# Patient Record
Sex: MALE | Race: WHITE | HISPANIC OR LATINO | Employment: FULL TIME | URBAN - METROPOLITAN AREA
[De-identification: names, ages, dates, MRNs, and addresses within clinical notes are randomized per-mention and may not be internally consistent; named-entity substitution may affect disease eponyms.]

---

## 2017-09-05 ENCOUNTER — OCCUPATIONAL MEDICINE (OUTPATIENT)
Dept: URGENT CARE | Facility: PHYSICIAN GROUP | Age: 27
End: 2017-09-05
Payer: COMMERCIAL

## 2017-09-05 VITALS
HEART RATE: 90 BPM | BODY MASS INDEX: 26.66 KG/M2 | HEIGHT: 65 IN | SYSTOLIC BLOOD PRESSURE: 130 MMHG | OXYGEN SATURATION: 99 % | TEMPERATURE: 98.6 F | RESPIRATION RATE: 16 BRPM | WEIGHT: 160 LBS | DIASTOLIC BLOOD PRESSURE: 68 MMHG

## 2017-09-05 DIAGNOSIS — S39.012A LUMBAR STRAIN, INITIAL ENCOUNTER: ICD-10-CM

## 2017-09-05 DIAGNOSIS — Z02.1 PRE-EMPLOYMENT DRUG SCREENING: ICD-10-CM

## 2017-09-05 LAB
AMP AMPHETAMINE: NORMAL
BREATH ALCOHOL COMMENT: NORMAL
COC COCAINE: NORMAL
INT CON NEG: NORMAL
INT CON POS: NORMAL
MET METHAMPHETAMINES: NORMAL
OPI OPIATES: NORMAL
PCP PHENCYCLIDINE: NORMAL
POC BREATHALIZER: 0 PERCENT (ref 0–0.01)
POC DRUG COMMENT 753798-OCCUPATIONAL HEALTH: NORMAL
THC: NORMAL

## 2017-09-05 PROCEDURE — 80305 DRUG TEST PRSMV DIR OPT OBS: CPT | Performed by: FAMILY MEDICINE

## 2017-09-05 PROCEDURE — 99203 OFFICE O/P NEW LOW 30 MIN: CPT | Performed by: FAMILY MEDICINE

## 2017-09-05 PROCEDURE — 82075 ASSAY OF BREATH ETHANOL: CPT | Performed by: FAMILY MEDICINE

## 2017-09-05 ASSESSMENT — PAIN SCALES - GENERAL: PAINLEVEL: 7=MODERATE-SEVERE PAIN

## 2017-09-05 NOTE — LETTER
"EMPLOYEE’S CLAIM FOR COMPENSATION/ REPORT OF INITIAL TREATMENT  FORM C-4    EMPLOYEE’S CLAIM - PROVIDE ALL INFORMATION REQUESTED   First Name  Tomas Last Name  Lorin Guerrier Birthdate                    1990                Sex  male Claim Number   Home Address  [place] Age  27 y.o. Height  1.651 m (5' 5\") Weight  72.6 kg (160 lb) N     [facility] [place]  [address] Telephone  [phone] (home)    Mailing Address  [place] [facility] Zip  [address] Primary Language Spoken  English    Insurer   Third Party    [employer]    Employee's Occupation (Job Title) When Injury or Occupational Disease Occurred       Employer's Name  [employer]  Telephone  [phone]    Employer Address  [address]  Zip  [address]    Date of Injury  9/5/2017               Hour of Injury  10:00 AM Date Employer Notified  9/5/2017 Last Day of Work after Injury or Occupational Disease  9/5/2017 Supervisor to Whom Injury Reported  Fili Yanez   Address or Location of Accident (if applicable)  [[place] [address]]   What were you doing at the time of accident? 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 (Be specific an answer in detail. Use additional sheet if necessary)  by lifting stuff   If you believe that you have an occupational disease, when did you first have knowledge of the disability and it relationship to your employment?  n/a Witnesses to the Accident  Jb Ac      Nature of Injury or Occupational Disease  Strain  Part(s) of Body Injured or Affected  Lower Back Area (Lumbar Area & Lumbo-Sacral), ,     I certify that the above is true and correct to the best of my knowledge and that I have provided this information in order to obtain the benefits of [place]  I hereby authorize any physician, chiropractor, surgeon, practitioner, or other person, any hospital, including [facility] or [facility], any medical service organization, any insurance company, or other institution or organization to release to each other, any medical or other information, including benefits paid or payable, pertinent to this injury or disease, except information relative to diagnosis, treatment and/or counseling for AIDS, psychological conditions, alcohol or controlled substances, for which I must give specific authorization.  A Photostat of this authorization shall be as valid as the original.     Date   Place   Employee’s Signature   THIS REPORT MUST BE COMPLETED AND MAILED WITHIN 3 WORKING DAYS OF TREATMENT   Place  [facility]  Name of Facility  [place]   Date  2017 Diagnosis  (Z02.1) Pre-employment drug screening Is there evidence the injured employee was under the influence of alcohol and/or another controlled substance at the time of accident?   Hour  12:19 PM Description of Injury or Disease  The encounter diagnosis was Pre-employment drug screening. No   Treatment  Over-the-counter ibuprofen 800 mg every 8 hours by mouth. Rice therapy. Stretching as discussed.  Have you advised the patient to remain off work five days or more? No   X-Ray Findings      If Yes   From Date  To Date      From information given by the employee, together with medical evidence, can you directly connect this injury or occupational disease as job incurred?  Yes If No Full Duty  No Modified Duty  Yes   Is additional medical care by a physician indicated?  Yes If Modified Duty, Specify any Limitations / Restrictions  Sitting: < or = to 2 hrs/day  Stoopin hrs/day Bending: < or = to 2 hrs/day  Squatting: < or = to 1 hr/day  Climbing: < or = to 2 hrs/day Pushing: < or = to 4 hrs/day  Pulling: < or = to 4  "hrs/day     Repetitive Actions  Not To Exceed Weight Limits   Weight Limit (LB): < or = to 10 pounds  Weight Limit (LB): < or = to 10 pounds       Do you know of any previous injury or disease contributing to this condition or occupational disease?                            No   Date  9/5/2017 Print Doctor’s Name Filemon Lantigua M.D. I certify the employer’s copy of  this form was mailed on:   Address  [address] Insurer’s Use Only     [facility] Zip  [address]    Provider’s Tax ID Number  [id number]  Telephone  Dept: [phone]        e-IFLEMON Campos M.D.   e-Signature: Dr. Fredis Lemons, Medical Director Degree  MD        ORIGINAL-TREATING PHYSICIAN OR CHIROPRACTOR    PAGE 2-INSURER/TPA    PAGE 3-EMPLOYER    PAGE 4-EMPLOYEE             Form C-4 (rev10/07)              BRIEF DESCRIPTION OF RIGHTS AND BENEFITS  (Pursuant to [place] 616[place])    Notice of Injury or Occupational Disease (Incident Report Form C-1): If an injury or occupational disease (OD) arises out of and in the  course of employment, you must provide written notice to your employer as soon as practicable, but no later than 7 days after the accident or  OD. Your employer shall maintain a sufficient supply of the required forms.    Claim for Compensation (Form C-4): If medical treatment is sought, the form C-4 is available at the place of initial treatment. A completed  \"Claim for Compensation\" (Form C-4) must be filed within 90 days after an accident or OD. The treating physician or chiropractor must,  within 3 working days after treatment, complete and mail to the employer, the employer's insurer and third-party , the Claim for  Compensation.    Medical Treatment: If you require medical treatment for your on-the-job injury or OD, you may be required to select a physician or  chiropractor from a list provided by your workers’ compensation insurer, if it has contracted with an Organization for Managed Care (MCO) " or  Preferred Provider Organization (PPO) or providers of health care. If your employer has not entered into a contract with an MCO or PPO, you  may select a physician or chiropractor from the Panel of Physicians and Chiropractors. Any medical costs related to your industrial injury or  OD will be paid by your insurer.    Temporary Total Disability (TTD): If your doctor has certified that you are unable to work for a period of at least 5 consecutive days, or 5  cumulative days in a 20-day period, or places restrictions on you that your employer does not accommodate, you may be entitled to TTD  compensation.    Temporary Partial Disability (TPD): If the wage you receive upon reemployment is less than the compensation for TTD to which you are  entitled, the insurer may be required to pay you TPD compensation to make up the difference. TPD can only be paid for a maximum of 24  months.    Permanent Partial Disability (PPD): When your medical condition is stable and there is an indication of a PPD as a result of your injury or  OD, within 30 days, your insurer must arrange for an evaluation by a rating physician or chiropractor to determine the degree of your PPD. The  amount of your PPD award depends on the date of injury, the results of the PPD evaluation and your age and wage.    Permanent Total Disability (PTD): If you are medically certified by a treating physician or chiropractor as permanently and totally disabled  and have been granted a PTD status by your insurer, you are entitled to receive monthly benefits not to exceed 66 2/3% of your average  monthly wage. The amount of your PTD payments is subject to reduction if you previously received a PPD award.    Vocational Rehabilitation Services: You may be eligible for vocational rehabilitation services if you are unable to return to the job due to a  permanent physical impairment or permanent restrictions as a result of your injury or occupational  disease.    Transportation and Per Sanju Reimbursement: You may be eligible for travel expenses and per sanju associated with medical treatment.    Reopening: You may be able to reopen your claim if your condition worsens after claim closure.    Appeal Process: If you disagree with a written determination issued by the insurer or the insurer does not respond to your request, you may  appeal to the Department of Administration, , by following the instructions contained in your determination letter. You must  appeal the determination within 70 days from the date of the determination letter at [address], [address], [place], [place]  [address], or [address] [place] [address], [place], [place] [address]. If you disagree with the  decision, you may appeal to the  Department of Administration, . You must file your appeal within 30 days from the date of the  decision  letter at [address], [address], [place], [place] [address], or [address], [place], [place] [address]. If you  disagree with a decision of an , you may file a petition for judicial review with the District Court. You must do so within 30  days of the Appeal Officer’s decision. You may be represented by an  at your own expense or you may contact the [facility] for possible  representation.    [place]  for Injured Workers (NAIW): If you disagree with a  decision, you may request that NAIW represent you  without charge at an  Hearing. For information regarding denial of benefits, you may contact the [facility] at: [address], [address], [place] [address], [phone], or [address] [address], [phone]    To File a Complaint with the Division: If you wish to file a complaint with the  of the Division of Industrial Relations (DIR),  please contact  the Workers’ Compensation Section, [address], [address], [place], [place] [address], telephone [phone]) [phone], or  [address], [address], [place], [place] [address], telephone [phone]) [phone].    For assistance with Workers’ Compensation Issues: you may contact the Office of the [facility] Consumer Health Assistance, [address] [place] [address], [address], [place], [place] [address], Toll Free 1-250.471.9738, Web site: http://govcha.[facility].nv., E-mail  [email or web address].                                                                                                                                                                                                                                   __________________________________________________________________                                                                   _________________                Employee Name / Signature                                                                                                                                                       Date                                                                                                                                                                                                     D-2 (rev. 10/07)

## 2017-09-05 NOTE — PATIENT INSTRUCTIONS
Back Pain, Adult  Back pain is very common in adults. The cause of back pain is rarely dangerous and the pain often gets better over time. The cause of your back pain may not be known. Some common causes of back pain include:  · Strain of the muscles or ligaments supporting the spine.  · Wear and tear (degeneration) of the spinal disks.  · Arthritis.  · Direct injury to the back.  For many people, back pain may return. Since back pain is rarely dangerous, most people can learn to manage this condition on their own.  HOME CARE INSTRUCTIONS  Watch your back pain for any changes. The following actions may help to lessen any discomfort you are feeling:  · Remain active. It is stressful on your back to sit or  one place for long periods of time. Do not sit, drive, or  one place for more than 30 minutes at a time. Take short walks on even surfaces as soon as you are able. Try to increase the length of time you walk each day.  · Exercise regularly as directed by your health care provider. Exercise helps your back heal faster. It also helps avoid future injury by keeping your muscles strong and flexible.  · Do not stay in bed. Resting more than 1-2 days can delay your recovery.  · Pay attention to your body when you bend and lift. The most comfortable positions are those that put less stress on your recovering back. Always use proper lifting techniques, including:  ¨ Bending your knees.  ¨ Keeping the load close to your body.  ¨ Avoiding twisting.  · Find a comfortable position to sleep. Use a firm mattress and lie on your side with your knees slightly bent. If you lie on your back, put a pillow under your knees.  · Avoid feeling anxious or stressed. Stress increases muscle tension and can worsen back pain. It is important to recognize when you are anxious or stressed and learn ways to manage it, such as with exercise.  · Take medicines only as directed by your health care provider. Over-the-counter  medicines to reduce pain and inflammation are often the most helpful. Your health care provider may prescribe muscle relaxant drugs. These medicines help dull your pain so you can more quickly return to your normal activities and healthy exercise.  · Apply ice to the injured area:  ¨ Put ice in a plastic bag.  ¨ Place a towel between your skin and the bag.  ¨ Leave the ice on for 20 minutes, 2-3 times a day for the first 2-3 days. After that, ice and heat may be alternated to reduce pain and spasms.  · Maintain a healthy weight. Excess weight puts extra stress on your back and makes it difficult to maintain good posture.  SEEK MEDICAL CARE IF:  · You have pain that is not relieved with rest or medicine.  · You have increasing pain going down into the legs or buttocks.  · You have pain that does not improve in one week.  · You have night pain.  · You lose weight.  · You have a fever or chills.  SEEK IMMEDIATE MEDICAL CARE IF:   · You develop new bowel or bladder control problems.  · You have unusual weakness or numbness in your arms or legs.  · You develop nausea or vomiting.  · You develop abdominal pain.  · You feel faint.     This information is not intended to replace advice given to you by your health care provider. Make sure you discuss any questions you have with your health care provider.     Document Released: 12/18/2006 Document Revised: 01/08/2016 Document Reviewed: 04/21/2015  [employer] Interactive Patient Education ©2016 [employer]

## 2017-09-05 NOTE — LETTER
"   [facility]   [address]  [place] [address]  Phone: [phone] - Fax: [phone]        [facility] Progress Report and Disability Certification  Date of Service: 9/5/2017   No Show:  No  Date / Time of Next Visit:  09/08/2017 @ 12:00 PM   Claim Information   Patient Name: Tomas Guerrier  Claim Number:     Employer: [employer]  Date of Injury: 9/5/2017     Insurer / TPA:   [employer] ID / SSN:     Occupation:    Diagnosis: The encounter diagnosis was Pre-employment drug screening.    Medical Information   Related to Industrial Injury? 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 Patient states that as he was lifting the plank he noticed significant increase in lower back pain that continued to increase as he worked throughout the morning. Patient denies previous back injury though states that he was mild back pain every morning which quickly improves as he works. Patient denies outside employment. Patient is unsure how long he has had mild back pain but states that it is \"as long as he can remember\". Patient denies ever having numbness or tingling or weakness in bilateral lower extremities. Patient denies current numbness, tingling, or weakness. Patient states that he is having significant difficulty walking due to pain in his lower back and sacrum.   Objective Findings: Tenderness to palpation left parasacral musculature. Gait significantly inhibited due to apparent pain. DTRs intact bilateral lower extremities. Sensation intact bilateral lower extremities. No edema to bilateral lower extremities. No midline tenderness to lumbar spine. Positive leg raise bilateral lower extremities.   Pre-Existing Condition(s):     Assessment:   Initial Visit    Status: Additional Care Required  Permanent Disability:No    "   Plan: Medication    Diagnostics:      Comments:       Disability Information   Status: Released to Restricted Duty    From:     Through:   Restrictions are: Temporary   Physical Restrictions   Sitting:  < or = to 2 hrs/day Standing:    Stoopin hrs/day Bending:  < or = to 2 hrs/day   Squatting:  < or = to 1 hr/day Walking:    Climbing:  < or = to 2 hrs/day Pushing:  < or = to 4 hrs/day   Pulling:  < or = to 4 hrs/day Other:    Reaching Above Shoulder (L):   Reaching Above Shoulder (R):       Reaching Below Shoulder (L):    Reaching Below Shoulder (R):      Not to exceed Weight Limits   Carrying(hrs):   Weight Limit(lb): < or = to 10 pounds Lifting(hrs):   Weight  Limit(lb): < or = to 10 pounds   Comments:      Repetitive Actions   Hands: i.e. Fine Manipulations from Grasping:     Feet: i.e. Operating Foot Controls:     Driving / Operate Machinery:     Physician Name: Filemon Lantigua M.D. Physician Signature: FILEMON York M.D. e-Signature: Dr. Fredis Lemons, Medical Director   Clinic Name / Location: [address] [address] Clinic Phone Number: Dept: [phone]   Appointment Time: 11:20 Am Visit Start Time: 12:19 PM   Check-In Time:  11:37 Am Visit Discharge Time:  1:09 PM   Original-Treating Physician or Chiropractor    Page 2-Insurer/TPA    Page 3-Employer    Page 4-Employee

## 2017-09-08 ENCOUNTER — OCCUPATIONAL MEDICINE (OUTPATIENT)
Dept: URGENT CARE | Facility: PHYSICIAN GROUP | Age: 27
End: 2017-09-08
Payer: COMMERCIAL

## 2017-09-08 VITALS
DIASTOLIC BLOOD PRESSURE: 60 MMHG | WEIGHT: 160 LBS | TEMPERATURE: 98.4 F | RESPIRATION RATE: 12 BRPM | OXYGEN SATURATION: 97 % | SYSTOLIC BLOOD PRESSURE: 110 MMHG | HEART RATE: 76 BPM | HEIGHT: 65 IN | BODY MASS INDEX: 26.66 KG/M2

## 2017-09-08 DIAGNOSIS — S39.012D LUMBAR STRAIN, SUBSEQUENT ENCOUNTER: ICD-10-CM

## 2017-09-08 PROCEDURE — 99213 OFFICE O/P EST LOW 20 MIN: CPT | Performed by: FAMILY MEDICINE

## 2017-09-08 ASSESSMENT — ENCOUNTER SYMPTOMS
NUMBNESS: 0
BOWEL INCONTINENCE: 0
BACK PAIN: 1
BACK PAIN: 1

## 2017-09-08 ASSESSMENT — PAIN SCALES - GENERAL: PAINLEVEL: 4=SLIGHT-MODERATE PAIN

## 2017-09-08 NOTE — PROGRESS NOTES
"Subjective:     Tomas Reeder a 27 y.o. male who presents for Work-Related Injury (DOI 9/5/2017 lower back lifting metal)    DOI 9/5/2017 patient endorses significant increase in his baseline back pain this morning when lifting a plank for scaffolding. Patient states that as he was lifting the plank he noticed significant increase in lower back pain that continued to increase as he worked throughout the morning. Patient denies previous back injury though states that he was mild back pain every morning which quickly improves as he works. Patient denies outside employment. Patient is unsure how long he has had mild back pain but states that it is \"as long as he can remember\". Patient denies ever having numbness or tingling or weakness in bilateral lower extremities. Patient denies current numbness, tingling, or weakness. Patient states that he is having significant difficulty walking due to pain in his lower back and sacrum.  Back Pain   This is a new problem. The problem occurs constantly. Pertinent negatives include no bladder incontinence, bowel incontinence or numbness.     Review of Systems   Gastrointestinal: Negative for bowel incontinence.   Genitourinary: Negative for bladder incontinence.   Musculoskeletal: Positive for back pain.   Neurological: Negative for numbness.     No Known Allergies   Objective:   /68   Pulse 90   Temp 37 °C (98.6 °F)   Resp 16   Ht 1.651 m (5' 5\")   Wt 72.6 kg (160 lb)   SpO2 99%   BMI 26.63 kg/m²   Physical Exam   Constitutional: He is oriented to person, place, and time. He appears well-developed and well-nourished. No distress.   HENT:   Head: Normocephalic and atraumatic.   Eyes: Conjunctivae and EOM are normal. Pupils are equal, round, and reactive to light.   Cardiovascular: Normal rate and regular rhythm.    No murmur heard.  Pulmonary/Chest: Effort normal and breath sounds normal. No respiratory distress.   Abdominal: Soft. He exhibits no distension. " There is no tenderness.   Neurological: He is alert and oriented to person, place, and time. He has normal reflexes. No sensory deficit.   Skin: Skin is warm and dry.   Psychiatric: He has a normal mood and affect.     Tenderness to palpation left parasacral musculature. Gait significantly inhibited due to apparent pain. DTRs intact bilateral lower extremities. Sensation intact bilateral lower extremities. No edema to bilateral lower extremities. No midline tenderness to lumbar spine. Positive leg raise bilateral lower extremities.  Assessment/Plan:   Assessment    1. Pre-employment drug screening  Differential diagnosis, natural history, supportive care, and indications for immediate follow-up discussed.   - POCT 6 Panel Urine Drug Screen    2. Lumbar strain, initial encounter  Differential diagnosis, natural history, supportive care, and indications for immediate follow-up discussed. Use over-the-counter pain reliever, such as acetaminophen (Tylenol), ibuprofen (Advil, Motrin) or naproxen (Aleve) as needed; follow package directions for dosing.

## 2017-09-08 NOTE — LETTER
"   [facility] [place]   [address]  [place] [address]  Phone: [phone] - Fax: [phone]        [facility] Progress Report and Disability Certification  Date of Service: 9/8/2017   No Show:  No  Date / Time of Next Visit:  9/15/17/12:oopm   Claim Information   Patient Name: Tomas Guerrier  Claim Number:     Employer: [employer]  Date of Injury: 9/5/2017     Insurer / TPA: [employer]  ID / SSN:     Occupation:    Diagnosis: The encounter diagnosis was Lumbar strain, subsequent encounter.    Medical Information   Related to Industrial Injury? 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 Patient states that as he was lifting the plank he noticed significant increase in lower back pain that continued to increase as he worked throughout the morning. Patient denies previous back injury though states that he was mild back pain every morning which quickly improves as he works. Patient denies outside employment. Patient is unsure how long he has had mild back pain but states that it is \"as long as he can remember\". Patient denies ever having numbness or tingling or weakness in bilateral lower extremities. Patient denies current numbness, tingling, or weakness. Patient states that he is having significant difficulty walking due to pain in his lower back and sacrum. FU 9/8/2017. Patient endorses improvement in pain up to 50% of normal. Patient states that he has been working as per work restrictions from last visit and that he has been taking over-the-counter medications as needed. Patient has been stretching regularly as previously instructed as well as using heat. Patient denies numbness, tingling, weakness at this time.   Objective Findings: Mild spasm noted paraspinal lumbar musculature. DTRs intact. Positive leg raise " left. Antalgic gait improved from last visit.   Pre-Existing Condition(s):     Assessment:   Condition Improved    Status: Additional Care Required  Permanent Disability:No    Plan:      Diagnostics:      Comments:       Disability Information   Status: Released to Restricted Duty    From:     Through:   Restrictions are:     Physical Restrictions   Sitting:    Standing:    Stooping:  < or = to 2 hrs/day Bending:  < or = to 4 hrs/day   Squatting:  < or = to 4 hrs/day Walking:    Climbing:  < or = to 1 hr/day Pushing:  < or = to 4 hrs/day   Pulling:  < or = to 4 hrs/day Other:    Reaching Above Shoulder (L):   Reaching Above Shoulder (R):       Reaching Below Shoulder (L):    Reaching Below Shoulder (R):      Not to exceed Weight Limits   Carrying(hrs):   Weight Limit(lb): < or = to 25 pounds Lifting(hrs):   Weight  Limit(lb): < or = to 25 pounds   Comments:      Repetitive Actions   Hands: i.e. Fine Manipulations from Grasping:     Feet: i.e. Operating Foot Controls:     Driving / Operate Machinery:     Physician Name: Filemon Lantigua M.D. Physician Signature: FILEMON York M.D. e-Signature: Dr. Fredis Lemons, Medical Director   Clinic Name / Location: [address] [address] Clinic Phone Number: Dept: [phone]   Appointment Time: 12:00 Pm Visit Start Time: 11:58 AM   Check-In Time:  11:42 Am Visit Discharge Time:  12:59 PM   Original-Treating Physician or Chiropractor    Page 2-Insurer/TPA    Page 3-Employer    Page 4-Employee

## 2017-09-08 NOTE — PROGRESS NOTES
"Subjective:      Tomas Guerrier is a 27 y.o. male who presents with Follow-Up ( [facility] DOI- 09/05/17 Lower back injury )      DOI 9/5/2017 patient endorses significant increase in his baseline back pain this morning when lifting a plank for scaffolding. Patient states that as he was lifting the plank he noticed significant increase in lower back pain that continued to increase as he worked throughout the morning. Patient denies previous back injury though states that he was mild back pain every morning which quickly improves as he works. Patient denies outside employment. Patient is unsure how long he has had mild back pain but states that it is \"as long as he can remember\". Patient denies ever having numbness or tingling or weakness in bilateral lower extremities. Patient denies current numbness, tingling, or weakness. Patient states that he is having significant difficulty walking due to pain in his lower back and sacrum. FU 9/8/2017. Patient endorses improvement in pain up to 50% of normal. Patient states that he has been working as per work restrictions from last visit and that he has been taking over-the-counter medications as needed. Patient has been stretching regularly as previously instructed as well as using heat. Patient denies numbness, tingling, weakness at this time.     Back Pain   This is a new problem.       Review of Systems   Musculoskeletal: Positive for back pain.          Objective:     /60   Pulse 76   Temp 36.9 °C (98.4 °F)   Resp 12   Ht 1.651 m (5' 5\")   Wt 72.6 kg (160 lb)   SpO2 97%   BMI 26.63 kg/m²      Physical Exam   Constitutional: He is oriented to person, place, and time. He appears well-developed and well-nourished. No distress.   HENT:   Head: Normocephalic and atraumatic.   Eyes: Conjunctivae are normal. Pupils are equal, round, and reactive to light.   Cardiovascular: Normal rate and regular rhythm.    Pulmonary/Chest: Effort normal and breath sounds normal. "   Neurological: He is alert and oriented to person, place, and time.   Skin: Skin is warm and dry.   Psychiatric: He has a normal mood and affect. Thought content normal.   Vitals reviewed.      Mild spasm noted paraspinal lumbar musculature. DTRs intact. Positive leg raise left. Antalgic gait improved from last visit.       Assessment/Plan:     1. Lumbar strain, subsequent encounter  Use over-the-counter pain reliever, such as acetaminophen (Tylenol), ibuprofen (Advil, Motrin) or naproxen (Aleve) as needed; follow package directions for dosing. Differential diagnosis, natural history, supportive care, and indications for immediate follow-up discussed.

## 2017-09-15 ENCOUNTER — OCCUPATIONAL MEDICINE (OUTPATIENT)
Dept: URGENT CARE | Facility: PHYSICIAN GROUP | Age: 27
End: 2017-09-15
Payer: COMMERCIAL

## 2017-09-15 VITALS
HEART RATE: 74 BPM | SYSTOLIC BLOOD PRESSURE: 112 MMHG | DIASTOLIC BLOOD PRESSURE: 80 MMHG | OXYGEN SATURATION: 95 % | WEIGHT: 155 LBS | TEMPERATURE: 98.1 F | HEIGHT: 65 IN | BODY MASS INDEX: 25.83 KG/M2

## 2017-09-15 DIAGNOSIS — S39.012D LUMBAR STRAIN, SUBSEQUENT ENCOUNTER: ICD-10-CM

## 2017-09-15 PROCEDURE — 99213 OFFICE O/P EST LOW 20 MIN: CPT | Performed by: PHYSICIAN ASSISTANT

## 2017-09-15 ASSESSMENT — ENCOUNTER SYMPTOMS
NAUSEA: 0
SHORTNESS OF BREATH: 0
BACK PAIN: 1
CHILLS: 0
VOMITING: 0
DIZZINESS: 0
DIARRHEA: 0
HEADACHES: 0
FEVER: 0
ABDOMINAL PAIN: 0

## 2017-09-15 NOTE — LETTER
[facility] [place] [address]  Phone: [phone] - Fax: [phone]        [facility] Progress Report and Disability Certification  Date of Service: 9/15/2017   No Show:  No  Date / Time of Next Visit: 9/22/2017/12:00 PM   Claim Information   Patient Name: Tomas Guerrier  Claim Number:     Employer: [employer]  Date of Injury: 9/5/2017     Insurer / TPA: [employer]  ID / SSN:     Occupation:    Diagnosis: The encounter diagnosis was Lumbar strain, subsequent encounter.    Medical Information   Related to Industrial Injury? 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 This is his 3rd visit, he reports significant improvement in his back pain since the incident occurred. He has been icing/heating, and stretching his lower back every night since the injury. He states the pain is almost resolved, but still has some pain while laying down at night and if he attempts to lift anything too heavy. Patient denies numbness, tingling, weakness at this time.    Objective Findings: Musculoskeletal: Normal range of motion.        Lumbar back: He exhibits pain. He exhibits normal range of motion, no tenderness, no bony tenderness, no deformity and no spasm.   Slight muscular tenderness over left lumbar region.    Pre-Existing Condition(s): None   Assessment:   Condition Improved    Status: Additional Care Required  Permanent Disability:No    Plan: Medication    Diagnostics:      Comments:       Disability Information   Status: Released to Restricted Duty    From:  9/15/2017  Through: 9/22/2017 Restrictions are: Temporary   Physical Restrictions   Sitting:    Standing:    Stooping:    Bending:      Squatting:    Walking:    Climbing:    Pushing:      Pulling:    Other:    Reaching Above Shoulder (L):   Reaching Above Shoulder (R):        Reaching Below Shoulder (L):    Reaching Below Shoulder (R):      Not to exceed Weight Limits   Carrying(hrs):   Weight Limit(lb): < or = to 50 pounds Lifting(hrs):   Weight  Limit(lb): < or = to 50 pounds   Comments: Low back pain improving as expected, almost resolved  Weight restrictions advanced today to 50 lbs  Continue daily icing/heating, and stretching exercises  RTC in 1 week for follow up, expect discharge at that time    Repetitive Actions   Hands: i.e. Fine Manipulations from Grasping:     Feet: i.e. Operating Foot Controls:     Driving / Operate Machinery:     Physician Name: Diane Patel P.A.-C. Physician Signature: DIANE Gonzalez P.A.-C. e-Signature: Dr. Fredis Lemons, Medical Director   Clinic Name / Location: [address] [address] Clinic Phone Number: Dept: [phone]   Appointment Time: 12:00 Pm Visit Start Time: 12:07 PM   Check-In Time:  11:52 Am Visit Discharge Time:  12:35PM   Original-Treating Physician or Chiropractor    Page 2-Insurer/TPA    Page 3-Employer    Page 4-Employee

## 2017-09-15 NOTE — PROGRESS NOTES
"Subjective:      Tomas Guerrier is a 27 y.o. male who presents with Follow-Up (workers comp)      DOI 9/5/2017 patient reports left sided lower back pain that started when lifting a plank for scaffolding. This is his 3rd visit, he reports significant improvement in his back pain since the incident occurred. He has been icing/heating, and stretching his lower back every night since the injury. He states the pain is almost resolved, but still has some pain while laying down at night and if he attempts to lift anything too heavy. Patient denies numbness, tingling, weakness at this time.      HPI    Review of Systems   Constitutional: Negative for chills and fever.   Respiratory: Negative for shortness of breath.    Cardiovascular: Negative for chest pain.   Gastrointestinal: Negative for abdominal pain, diarrhea, nausea and vomiting.   Genitourinary: Negative.    Musculoskeletal: Positive for back pain.   Neurological: Negative for dizziness and headaches.          Objective:     /80   Pulse 74   Temp 36.7 °C (98.1 °F)   Ht 1.651 m (5' 5\")   Wt 70.3 kg (155 lb)   SpO2 95%   BMI 25.79 kg/m²      Physical Exam   Constitutional: He is oriented to person, place, and time. He appears well-developed and well-nourished. No distress.   HENT:   Head: Normocephalic and atraumatic.   Eyes: Pupils are equal, round, and reactive to light.   Neck: Normal range of motion.   Cardiovascular: Normal rate.    Pulmonary/Chest: Effort normal.   Musculoskeletal: Normal range of motion.        Lumbar back: He exhibits pain. He exhibits normal range of motion, no tenderness, no bony tenderness, no deformity and no spasm.   Slight muscular tenderness over left lumbar region.   Neurological: He is alert and oriented to person, place, and time.   Skin: Skin is warm and dry. He is not diaphoretic.   Psychiatric: He has a normal mood and affect. His behavior is normal.   Nursing note and vitals reviewed.          Medications, " Allergies, and current problem list reviewed today in Epic       Assessment/Plan:     1. Lumbar strain, subsequent encounter  Low back pain improving as expected, almost resolved  Weight restrictions advanced today to 50 lbs  Continue daily icing/heating, and stretching exercises  RTC in 1 week for follow up, expect discharge at that time

## 2017-09-22 ENCOUNTER — OCCUPATIONAL MEDICINE (OUTPATIENT)
Dept: URGENT CARE | Facility: PHYSICIAN GROUP | Age: 27
End: 2017-09-22
Payer: COMMERCIAL

## 2017-09-22 VITALS
BODY MASS INDEX: 25.83 KG/M2 | DIASTOLIC BLOOD PRESSURE: 80 MMHG | HEART RATE: 67 BPM | SYSTOLIC BLOOD PRESSURE: 114 MMHG | HEIGHT: 65 IN | OXYGEN SATURATION: 96 % | TEMPERATURE: 98.3 F | WEIGHT: 155 LBS

## 2017-09-22 DIAGNOSIS — S39.012D LUMBAR STRAIN, SUBSEQUENT ENCOUNTER: ICD-10-CM

## 2017-09-22 PROCEDURE — 99213 OFFICE O/P EST LOW 20 MIN: CPT | Performed by: FAMILY MEDICINE

## 2017-09-22 NOTE — PROGRESS NOTES
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 This is his 4th visit, he reports that he is \"90%\" improved and he is no longer requiring any ibuprofen         The pain does not radiate.   Pertinent negatives include no bladder incontinence, bowel incontinence, dysuria, fever, headaches, numbness or weakness.     Social hx - denies tobacco, alcohol, drug use    Family hx was reviewed - no pertinent past family hx      Past medical history was unremarkable and not pertinent to current issue    Review of Systems   Constitutional: Negative for fever.   Gastrointestinal: Negative for bowel incontinence.   Genitourinary: Negative for bladder incontinence, dysuria, urgency and frequency.   Musculoskeletal: Positive for back pain.   Neurological: Negative for weakness, numbness and headaches.   All other systems reviewed and are negative.         Objective:     Blood pressure 114/80, pulse 67, temperature 36.8 °C (98.3 °F), height 1.651 m (5' 5\"), weight 70.3 kg (155 lb), SpO2 96 %.    Physical Exam   Constitutional: pt is oriented to person, place, and time. Pt appears well-developed and well-nourished. No distress.   HENT:   Head: Normocephalic and atraumatic.   Eyes: EOM are normal. Pupils are equal, round, and reactive to light. No scleral icterus.   Neck: Normal range of motion. Neck supple. No thyromegaly present.   Cardiovascular: Normal rate, regular rhythm and normal heart sounds.    Pulmonary/Chest: Effort normal and breath sounds normal. No respiratory distress.  no wheezes.   no rales.  no tenderness.   Musculoskeletal: pt exhibits no edema.                  Lumbar back: pt exhibits full AROM.   No spasm or tenderness noted.   Pt exhibits no bony tenderness, no swelling, no edema, no deformity  .   Neurological: patient is " alert and oriented to person, place, and time. Patient has normal reflexes. No cranial nerve deficit. Patient exhibits normal muscle tone.      STRAIGHT LEG RAISE, GRETA NEGATIVE BILAT  Skin: Skin is warm and dry. No rash noted. No erythema.   Psychiatric: patient has a normal mood and affect.  behavior is normal.   Nursing note and vitals reviewed.              Assessment/Plan:       1. Lumbar strain, subsequent encounter  Improved  Cleared for full duty  F/u 5-7d - expect MMI at that time  If pain persists, will have him f/u in Occupational Med clinic

## 2017-09-22 NOTE — LETTER
"   [facility] [place] [address]  Phone:  [phone] - Fax:  [phone]   [facility] Progress Report and Disability Certification  Date of Service: 9/22/2017   No Show:  No  Date / Time of Next Visit:  09/29/2017 @ 1:00 PM   Claim Information   Patient Name: Tomas Guerrier  Claim Number:     Employer: [employer]  Date of Injury: 9/5/2017     Insurer / TPA:   [employer] Admin ID / SSN:     Occupation:    Diagnosis: The encounter diagnosis was Lumbar strain, subsequent encounter.    Medical Information   Related to Industrial Injury? 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 This is his 4th visit, he reports that he is \"90%\" improved and he is no longer requiring any ibuprofen          The pain does not radiate.   Pertinent negatives include no bladder incontinence, bowel incontinence, dysuria, fever, headaches, numbness or weakness.    Objective Findings:      Lumbar back: pt exhibits full AROM.   No spasm or tenderness noted.   Pt exhibits no bony tenderness, no swelling, no edema, no deformity  .   Neurological: patient is alert and oriented to person, place, and time. Patient has normal reflexes. No cranial nerve deficit. Patient exhibits normal muscle tone.      STRAIGHT LEG RAISE, GRETA NEGATIVE BILAT   Pre-Existing Condition(s):     Assessment:   Condition Improved    Status: Additional Care Required  Permanent Disability:No    Plan:      Diagnostics:      Comments:       Disability Information   Status: Released to Full Duty    From:     Through:   Restrictions are:     Physical Restrictions   Sitting:    Standing:    Stooping:    Bending:      Squatting:    Walking:    Climbing:    Pushing:      Pulling:    Other:    Reaching Above Shoulder (L):   Reaching Above Shoulder (R):       Reaching Below Shoulder (L):  " Reaching Below Shoulder (R):      Not to exceed Weight Limits   Carrying(hrs):   Weight Limit(lb):   Lifting(hrs):   Weight  Limit(lb):     Comments: Lumbar strain, subsequent encounter  Improved  Cleared for full duty  F/u 5-7d    Repetitive Actions   Hands: i.e. Fine Manipulations from Grasping:     Feet: i.e. Operating Foot Controls:     Driving / Operate Machinery:     Physician Name: Tucker Terry M.D. Physician Signature: TUCKER Rajput M.D. e-Signature: Dr. Fredis Lemons, Medical Director   Clinic Name / Location: [address] [address] Clinic Phone Number: Dept: [phone]   Appointment Time: 12:00 Pm Visit Start Time: 11:49 AM   Check-In Time:  11:43 Am Visit Discharge Time:  12:20 PM   Original-Treating Physician or Chiropractor    Page 2-Insurer/TPA    Page 3-Employer    Page 4-Employee

## 2017-09-29 ENCOUNTER — OCCUPATIONAL MEDICINE (OUTPATIENT)
Dept: URGENT CARE | Facility: PHYSICIAN GROUP | Age: 27
End: 2017-09-29
Payer: COMMERCIAL

## 2017-09-29 VITALS
DIASTOLIC BLOOD PRESSURE: 80 MMHG | OXYGEN SATURATION: 96 % | HEART RATE: 72 BPM | WEIGHT: 155 LBS | SYSTOLIC BLOOD PRESSURE: 118 MMHG | HEIGHT: 65 IN | TEMPERATURE: 97.7 F | RESPIRATION RATE: 14 BRPM | BODY MASS INDEX: 25.83 KG/M2

## 2017-09-29 DIAGNOSIS — S39.012D LOW BACK STRAIN, SUBSEQUENT ENCOUNTER: ICD-10-CM

## 2017-09-29 PROCEDURE — 99213 OFFICE O/P EST LOW 20 MIN: CPT | Mod: 29 | Performed by: NURSE PRACTITIONER

## 2017-09-29 NOTE — PROGRESS NOTES
"Subjective:      Tomas Guerrier is a 27 y.o. male who presents with Follow-Up (work comp)            HPI DOI: 9/5/17. 27 year old male here for 5th follow up visit for lower back strain following lifting planks at work. He is no longer taking medication for this. He is stiff in the morning still. Denies numbness or tingling down legs at this time. He denies pain, just stiffness.   Allergies, medications and history reviewed by me today      ROS  Please see HPI       Objective:     /80   Pulse 72   Temp 36.5 °C (97.7 °F)   Resp 14   Ht 1.651 m (5' 5\")   Wt 70.3 kg (155 lb)   SpO2 96%   BMI 25.79 kg/m²      Physical Exam   Constitutional: He appears well-developed and well-nourished. No distress.   Cardiovascular: Normal rate, regular rhythm and normal heart sounds.    No murmur heard.  Pulmonary/Chest: Effort normal and breath sounds normal.   Musculoskeletal:        Lumbar back: He exhibits normal range of motion, no tenderness, no swelling, no pain and no spasm.   Mild discomfort with back extension.   Neurological: He is alert. No sensory deficit. He exhibits normal muscle tone. Gait normal.   Reflex Scores:       Patellar reflexes are 2+ on the right side and 2+ on the left side.  Skin: Skin is warm and dry.   Psychiatric: He has a normal mood and affect. His behavior is normal. Thought content normal.   Nursing note and vitals reviewed.              Assessment/Plan:     1. Low back strain, subsequent encounter       To [facility] for follow up due to residual stiffness.  Full duty.  Differential diagnosis, natural history, supportive care, and indications for immediate follow-up discussed at length.         "

## 2017-09-29 NOTE — LETTER
[facility] [place]  [address]  [place] [address]  Phone:  [phone] - Fax:  [phone]   [facility] Progress Report and Disability Certification  Date of Service: 9/29/2017   No Show:  No  Date / Time of Next Visit: 10/6/2017   Claim Information   Patient Name: Tomas Guerrier  Claim Number:     Employer: [employer]  Date of Injury: 9/5/2017     Insurer / TPA:    ID / SSN:     Occupation:    Diagnosis: The encounter diagnosis was Low back strain, subsequent encounter.    Medical Information   Related to Industrial Injury? Yes    Subjective Complaints:  DOI: 9/5/17. 27 year old male here for 5th follow up visit for lower back strain following lifting planks at work. He is no longer taking medication for this. He is stiff in the morning still. Denies numbness or tingling down legs at this time. He denies pain, just stiffness.   Allergies, medications and history reviewed by me today       Objective Findings: Physical Exam   Constitutional: He appears well-developed and well-nourished. No distress.   Cardiovascular: Normal rate, regular rhythm and normal heart sounds.    No murmur heard.  Pulmonary/Chest: Effort normal and breath sounds normal.   Musculoskeletal:        Lumbar back: He exhibits normal range of motion, no tenderness, no swelling, no pain and no spasm.   Mild discomfort with back extension.   Neurological: He is alert. No sensory deficit. He exhibits normal muscle tone. Gait normal.   Reflex Scores:       Patellar reflexes are 2+ on the right side and 2+ on the left side.  Skin: Skin is warm and dry.   Psychiatric: He has a normal mood and affect. His behavior is normal. Thought content normal.   Nursing note and vitals reviewed.     Pre-Existing Condition(s):     Assessment:   Condition Improved    Status: Additional Care Required  Permanent Disability:No    Plan:      Diagnostics:      Comments:  FOLLOW UP AT [facility] FOR NEXT  APPT IN ONE WEEK.    Disability Information   Status: Released to Full Duty    From:  9/29/2017  Through: 10/6/2017 Restrictions are:     Physical Restrictions   Sitting:    Standing:    Stooping:    Bending:      Squatting:    Walking:    Climbing:    Pushing:      Pulling:    Other:    Reaching Above Shoulder (L):   Reaching Above Shoulder (R):       Reaching Below Shoulder (L):    Reaching Below Shoulder (R):      Not to exceed Weight Limits   Carrying(hrs):   Weight Limit(lb):   Lifting(hrs):   Weight  Limit(lb):     Comments:      Repetitive Actions   Hands: i.e. Fine Manipulations from Grasping:     Feet: i.e. Operating Foot Controls:     Driving / Operate Machinery:     Physician Name: BIANKA Cottrell Physician Signature: PANFILO Banerjee e-Signature:  , Medical Director   Clinic Name / Location: [address] [address] Clinic Phone Number: Dept: [phone]   Appointment Time: 1:00 Pm Visit Start Time: 12:28 PM   Check-In Time:  12:03 Pm Visit Discharge Time:     Original-Treating Physician or Chiropractor    Page 2-Insurer/TPA    Page 3-Employer    Page 4-Employee

## 2017-10-06 ENCOUNTER — OCCUPATIONAL MEDICINE (OUTPATIENT)
Dept: URGENT CARE | Facility: PHYSICIAN GROUP | Age: 27
End: 2017-10-06
Payer: COMMERCIAL

## 2017-10-06 VITALS
RESPIRATION RATE: 14 BRPM | BODY MASS INDEX: 25.83 KG/M2 | HEART RATE: 76 BPM | WEIGHT: 155 LBS | OXYGEN SATURATION: 97 % | SYSTOLIC BLOOD PRESSURE: 130 MMHG | DIASTOLIC BLOOD PRESSURE: 72 MMHG | TEMPERATURE: 98.7 F | HEIGHT: 65 IN

## 2017-10-06 DIAGNOSIS — S39.012D STRAIN OF LUMBAR REGION, SUBSEQUENT ENCOUNTER: ICD-10-CM

## 2017-10-06 PROCEDURE — 99213 OFFICE O/P EST LOW 20 MIN: CPT | Performed by: FAMILY MEDICINE

## 2017-10-06 ASSESSMENT — PAIN SCALES - GENERAL: PAINLEVEL: 2=MINIMAL-SLIGHT

## 2017-10-06 NOTE — PROGRESS NOTES
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 This is his 6th visit      he is no longer requiring any ibuprofen, but states that the low back pain persists.  +am \"stiffness\"            The pain does not radiate.   Pertinent negatives include no bladder incontinence, bowel incontinence, dysuria, fever, headaches, numbness or weakness.      Social hx - denies tobacco, alcohol, drug use     Family hx was reviewed - no pertinent past family hx        Past medical history was unremarkable and not pertinent to current issue     Review of Systems   Constitutional: Negative for fever.   Gastrointestinal: Negative for bowel incontinence.   Genitourinary: Negative for bladder incontinence, dysuria, urgency and frequency.   Musculoskeletal: Positive for back pain.   Neurological: Negative for weakness, numbness and headaches.   All other systems reviewed and are negative.            Objective:      Blood pressure 130/72, pulse 76, temperature 37.1 °C (98.7 °F), resp. rate 14, height 1.651 m (5' 5\"), weight 70.3 kg (155 lb), SpO2 97 %.         Physical Exam   Constitutional: pt is oriented to person, place, and time. Pt appears well-developed and well-nourished. No distress.   HENT:   Head: Normocephalic and atraumatic.   Eyes: EOM are normal. Pupils are equal, round, and reactive to light. No scleral icterus.   Neck: Normal range of motion. Neck supple. No thyromegaly present.   Cardiovascular: Normal rate, regular rhythm and normal heart sounds.    Pulmonary/Chest: Effort normal and breath sounds normal. No respiratory distress.  no wheezes.   no rales.  no tenderness.   Musculoskeletal: pt exhibits no edema.                  Lumbar back: pt exhibits full AROM.   No spasm or tenderness noted.   Pt exhibits no bony tenderness, no " swelling, no edema, no deformity  .   Neurological: patient is alert and oriented to person, place, and time. Patient has normal reflexes. No cranial nerve deficit. Patient exhibits normal muscle tone.      STRAIGHT LEG RAISE, GRETA NEGATIVE BILAT  Skin: Skin is warm and dry. No rash noted. No erythema.   Psychiatric: patient has a normal mood and affect.  behavior is normal.   Nursing note and vitals reviewed.                 Assessment/Plan:         1. Lumbar strain, subsequent encounter  Pain persists  Continue full duty - pt notes no difference in pain between full duty and restricted duty.     Cleared for full duty  Referred for PT  F/u 7-10d

## 2017-10-06 NOTE — LETTER
"   [facility]  [address] [place] [address]  Phone:  [phone] - Fax:  [phone]   [facility] Progress Report and Disability Certification  Date of Service: 10/6/2017   No Show:  No  Date / Time of Next Visit:     Claim Information   Patient Name: Tomas Guerrier  Claim Number:     Employer: [employer] Date of Injury: 9/5/2017     Insurer / TPA: [employer]  ID / SSN:     Occupation:    Diagnosis: The encounter diagnosis was Strain of lumbar region, subsequent encounter.    Medical Information   Related to Industrial Injury? 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 This is his 6th visit      he is no longer requiring any ibuprofen, but states that the low back pain persists.  +am \"stiffness\"            The pain does not radiate.   Pertinent negatives include no bladder incontinence, bowel incontinence, dysuria, fever, headaches, numbness or weakness.      Social hx - denies tobacco, alcohol, drug use     Family hx was reviewed - no pertinent past family hx      Objective Findings:             Lumbar back: pt exhibits full AROM.   No spasm or tenderness noted.   Pt exhibits no bony tenderness, no swelling, no edema, no deformity  .   Neurological: patient is alert and oriented to person, place, and time. Patient has normal reflexes. No cranial nerve deficit. Patient exhibits normal muscle tone.      STRAIGHT LEG RAISE, GRETA NEGATIVE BILAT   Pre-Existing Condition(s):     Assessment:   Condition Same    Status: Additional Care Required  Permanent Disability:No    Plan: PT    Diagnostics:      Comments:       Disability Information   Status: Released to Full Duty    From:     Through:   Restrictions are:     Physical Restrictions   Sitting:    Standing:    Stooping:    Bending:      Squatting:    Walking:    Climbing:  " Pushing:      Pulling:    Other:    Reaching Above Shoulder (L):   Reaching Above Shoulder (R):       Reaching Below Shoulder (L):    Reaching Below Shoulder (R):      Not to exceed Weight Limits   Carrying(hrs):   Weight Limit(lb):   Lifting(hrs):   Weight  Limit(lb):     Comments:      Repetitive Actions   Hands: i.e. Fine Manipulations from Grasping:     Feet: i.e. Operating Foot Controls:     Driving / Operate Machinery:     Physician Name: Tucker Terry M.D. Physician Signature:   e-Signature:  , Medical Director   Clinic Name / Location: [address] [address] Clinic Phone Number: Dept: [phone]   Appointment Time: 3:30 Pm Visit Start Time: 3:33 PM   Check-In Time:  3:29 Pm Visit Discharge Time: 4:10 Pm    Original-Treating Physician or Chiropractor    Page 2-Insurer/TPA    Page 3-Employer    Page 4-Employee

## 2017-10-20 ENCOUNTER — OCCUPATIONAL MEDICINE (OUTPATIENT)
Dept: OCCUPATIONAL MEDICINE | Facility: CLINIC | Age: 27
End: 2017-10-20
Payer: COMMERCIAL

## 2017-10-20 VITALS
TEMPERATURE: 97.2 F | RESPIRATION RATE: 16 BRPM | HEART RATE: 72 BPM | WEIGHT: 155 LBS | OXYGEN SATURATION: 95 % | DIASTOLIC BLOOD PRESSURE: 58 MMHG | SYSTOLIC BLOOD PRESSURE: 118 MMHG | HEIGHT: 65 IN | BODY MASS INDEX: 25.83 KG/M2

## 2017-10-20 DIAGNOSIS — S39.012D STRAIN OF LUMBAR REGION, SUBSEQUENT ENCOUNTER: ICD-10-CM

## 2017-10-20 PROCEDURE — 99212 OFFICE O/P EST SF 10 MIN: CPT | Performed by: PREVENTIVE MEDICINE

## 2017-10-20 ASSESSMENT — PAIN SCALES - GENERAL: PAINLEVEL: NO PAIN

## 2017-10-20 NOTE — LETTER
[address],   [address] [place] [address]  Phone:  [phone] - Fax:  [phone]   [facility] Progress Report and Disability Certification  Date of Service: 10/20/2017   No Show:  No  Date / Time of Next Visit:  Release from care   Claim Information   Patient Name: Tomas Guerrier  Claim Number:     Employer: [employer]  Date of Injury: 9/5/2017     Insurer / TPA: [employer]  ID / SSN:     Occupation:    Diagnosis: The encounter diagnosis was Strain of lumbar region, subsequent encounter.    Medical Information   Related to Industrial Injury? No    Subjective Complaints:  DOI 9/5/2017: Patient states that as he was lifting the plank he noticed significant increase in lower back pain afterwards. Prior notes indicate that he wakes up every morning with lower back pain at baseline, which improves  throughout the day with movement. Patient states the lower back pain is resolved. He doesn't have any pains been able to work full duty without difficulty. Ready for release from care.   Objective Findings: Lumbar: No gross deformity. No tenderness to palpation. Full range of motion without pain or difficulty.   Pre-Existing Condition(s): Daily for back pain and morning, improves throughout the day with movement   Assessment:   Condition Improved    Status: Discharged /  MMI  Permanent Disability:No    Plan:      Diagnostics:      Comments:  Release from care  Full duty  Follow-up as needed    Disability Information   Status: Released to Full Duty    From:  10/20/2017  Through:   Restrictions are:     Physical Restrictions   Sitting:    Standing:    Stooping:    Bending:      Squatting:    Walking:    Climbing:    Pushing:      Pulling:    Other:    Reaching Above Shoulder (L):   Reaching Above Shoulder (R):       Reaching Below Shoulder (L):    Reaching Below Shoulder (R):      Not to exceed Weight Limits      Carrying(hrs):   Weight Limit(lb):   Lifting(hrs):   Weight  Limit(lb):     Comments:      Repetitive Actions   Hands: i.e. Fine Manipulations from Grasping:     Feet: i.e. Operating Foot Controls:     Driving / Operate Machinery:     Physician Name: Stefano Lee D.O. Physician Signature: [email or web address] VÁZQUEZ D.O. e-Signature: Dr. Fredis Lemons, Medical Director   Clinic Name / Location: [address],   [address] [address] Clinic Phone Number: Dept: [phone]   Appointment Time: 4:40 Pm Visit Start Time: 4:28 PM   Check-In Time:  4:27 Pm Visit Discharge Time: 4:44 PM   Original-Treating Physician or Chiropractor    Page 2-Insurer/TPA    Page 3-Employer    Page 4-Employee

## 2017-10-20 NOTE — PROGRESS NOTES
"Subjective:      Tomas Guerrier is a 27 y.o. male who presents with Follow-Up ([facility] lower back DOI:9/5/17 feeling better Room#2)      DOI 9/5/2017: Patient states that as he was lifting the plank he noticed significant increase in lower back pain afterwards. Prior notes indicate that he wakes up every morning with lower back pain at baseline, which improves  throughout the day with movement. Patient states the lower back pain is resolved. He doesn't have any pains been able to work full duty without difficulty. Ready for release from care.     HPI    ROS       Objective:     /58   Pulse 72   Temp 36.2 °C (97.2 °F)   Resp 16   Ht 1.651 m (5' 5\")   Wt 70.3 kg (155 lb)   SpO2 95%   BMI 25.79 kg/m²      Physical Exam    Lumbar: No gross deformity. No tenderness to palpation. Full range of motion without pain or difficulty.       Assessment/Plan:     1. Strain of lumbar region, subsequent encounter  Release from care  Full duty  Follow-up as needed      "

## 2018-04-09 ENCOUNTER — OCCUPATIONAL MEDICINE (OUTPATIENT)
Dept: URGENT CARE | Facility: PHYSICIAN GROUP | Age: 28
End: 2018-04-09
Payer: OTHER MISCELLANEOUS

## 2018-04-09 ENCOUNTER — APPOINTMENT (OUTPATIENT)
Dept: URGENT CARE | Facility: PHYSICIAN GROUP | Age: 28
End: 2018-04-09
Payer: OTHER MISCELLANEOUS

## 2018-04-09 VITALS
TEMPERATURE: 97.3 F | DIASTOLIC BLOOD PRESSURE: 68 MMHG | HEIGHT: 65 IN | HEART RATE: 60 BPM | WEIGHT: 160 LBS | OXYGEN SATURATION: 100 % | RESPIRATION RATE: 16 BRPM | SYSTOLIC BLOOD PRESSURE: 110 MMHG | BODY MASS INDEX: 26.66 KG/M2

## 2018-04-09 DIAGNOSIS — M53.3 SACRO-ILIAC PAIN: ICD-10-CM

## 2018-04-09 DIAGNOSIS — Z02.1 PRE-EMPLOYMENT DRUG SCREENING: ICD-10-CM

## 2018-04-09 LAB
AMP AMPHETAMINE: NORMAL
BREATH ALCOHOL COMMENT: NORMAL
COC COCAINE: NORMAL
INT CON NEG: NEGATIVE
INT CON POS: POSITIVE
MET METHAMPHETAMINES: NORMAL
OPI OPIATES: NORMAL
PCP PHENCYCLIDINE: NORMAL
POC BREATHALIZER: 0 PERCENT (ref 0–0.01)
POC DRUG COMMENT 753798-OCCUPATIONAL HEALTH: NEGATIVE
THC: NORMAL

## 2018-04-09 PROCEDURE — 80305 DRUG TEST PRSMV DIR OPT OBS: CPT | Mod: 29 | Performed by: PHYSICIAN ASSISTANT

## 2018-04-09 PROCEDURE — 82075 ASSAY OF BREATH ETHANOL: CPT | Mod: 29 | Performed by: PHYSICIAN ASSISTANT

## 2018-04-09 PROCEDURE — 99204 OFFICE O/P NEW MOD 45 MIN: CPT | Performed by: INTERNAL MEDICINE

## 2018-04-09 ASSESSMENT — PAIN SCALES - GENERAL: PAINLEVEL: 8=MODERATE-SEVERE PAIN

## 2018-04-09 ASSESSMENT — ENCOUNTER SYMPTOMS
SENSORY CHANGE: 0
CONSTITUTIONAL NEGATIVE: 1
BACK PAIN: 1
NAUSEA: 0
FEVER: 0
WEIGHT LOSS: 0
EYES NEGATIVE: 1
SORE THROAT: 0
VOMITING: 0
CHILLS: 0
HEADACHES: 0
PALPITATIONS: 0
TINGLING: 0
COUGH: 0
BLOOD IN STOOL: 0
FOCAL WEAKNESS: 0
DIZZINESS: 0
DIARRHEA: 0
FALLS: 0
SHORTNESS OF BREATH: 0
SPEECH CHANGE: 0

## 2018-04-09 NOTE — PROGRESS NOTES
"Tomas Guerrier is a 28 y.o. male who presents for Work-Related Injury (Lower left back pain, DOI, 4/4/18)    Patient is a 28-year-old male who presents today with left lower back pain. Patient has had a recent injury not too long ago. 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 States that he was lifting last week and noticed worsening pain throughout the day. Patient denies any numbness or weakness in his leg. No incontinence. Patient took some medicine and his mother said with minimal relief.  HPI  PMH:  has no past medical history on file.  MEDS: No current outpatient prescriptions on file.  ALLERGIES: No Known Allergies  SURGHX: History reviewed. No pertinent surgical history.  SOCHX:  reports that he has been smoking.  He has never used smokeless tobacco. He reports that he drinks alcohol. He reports that he does not use drugs.  FH: family history is not on file.    Review of Systems   Constitutional: Negative.  Negative for chills, fever and weight loss.   HENT: Negative for sore throat.    Eyes: Negative.    Respiratory: Negative for cough and shortness of breath.    Cardiovascular: Negative for chest pain, palpitations and leg swelling.   Gastrointestinal: Negative for blood in stool, diarrhea, nausea and vomiting.   Genitourinary: Negative for dysuria.   Musculoskeletal: Positive for back pain and joint pain. Negative for falls.   Skin: Negative for rash.   Neurological: Negative for dizziness (negative headache), tingling, sensory change, speech change, focal weakness and headaches.     No Known Allergies   Objective:   /68   Pulse 60   Temp 36.3 °C (97.3 °F)   Resp 16   Ht 1.651 m (5' 5\")   Wt 72.6 kg (160 lb)   SpO2 100%   BMI 26.63 kg/m²   Physical Exam   Constitutional: He appears well-developed and well-nourished. No distress.   HENT:   Head: Normocephalic and atraumatic.   Eyes: Conjunctivae are normal. Pupils are equal, round, and reactive to light. "   Cardiovascular: Normal rate and regular rhythm.    Pulmonary/Chest: Effort normal and breath sounds normal.   Musculoskeletal:        Lumbar back: He exhibits decreased range of motion, tenderness and pain. He exhibits no bony tenderness and no spasm.        Back:    Nursing note and vitals reviewed.    Patient with tenderness over his left sacroiliac joint. No significant muscle spasm at this point. Deep tendon reflexes are equal bilaterally. No obvious weakness or sensory deficit.   Assessment/Plan:   Assessment    1. Sacro-iliac pain  Differential diagnosis, natural history, supportive care, and indications for immediate follow-up discussed.  Over-the-counter ibuprofen  Patient given strengthening and stretching exercises  Patient to use back support when lifting  Patient given instructions about proper lifting technique.  Patient follow up in several days.

## 2018-04-09 NOTE — LETTER
"EMPLOYEE’S CLAIM FOR COMPENSATION/ REPORT OF INITIAL TREATMENT  FORM C-4    EMPLOYEE’S CLAIM - PROVIDE ALL INFORMATION REQUESTED   First Name  Tomas Last Name  Lorin Guerrier Birthdate                    1990                Sex  male Claim Number   Home Address  [address] Age  28 y.o. Height  1.651 m (5' 5\") Weight  72.6 kg (160 lb) N     [facility] Zip  [address] Telephone  [phone] (home)    Mailing Address  [address] [facility] Zip  [address] Primary Language Spoken  English    Insurer   Third Party    Abel Joseph   Employee's Occupation (Job Title) When Injury or Occupational Disease Occurred      Employer's Name  [employer]  Telephone  [phone]    Employer Address  [address]  [facility] [place]  Zip  [address]    Date of Injury  4/4/2018               Hour of Injury  12:00 PM Date Employer Notified  4/9/2018 Last Day of Work after Injury or Occupational Disease  4/6/2018 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 (if applicable)  Working    How did this injury or occupational disease occur? (Be specific an answer in detail. Use additional sheet if necessary)  Carrying scaffolding   If you believe that you have an occupational disease, when did you first have knowledge of the disability and it relationship to your employment?  n/a Witnesses to the Accident  none      Nature of Injury or Occupational Disease  Strain  Part(s) of Body Injured or Affected  Lower Back Area (Lumbar Area & Lumbo-Sacral), N/A, N/A    I certify that the above is true and correct to the best of my knowledge and that I have provided this information in order to obtain the benefits of [place]).  I hereby authorize any physician, chiropractor, surgeon, practitioner, or other person, any hospital, including [facility] or [facility], any medical service organization, any insurance company, or other institution or organization to release to each other, any medical or other information, including benefits paid or payable, pertinent to this injury or disease, except information relative to diagnosis, treatment and/or counseling for AIDS, psychological conditions, alcohol or controlled substances, for which I must give specific authorization.  A Photostat of this authorization shall be as valid as the original.     Date   Place   Employee’s Signature   THIS REPORT MUST BE COMPLETED AND MAILED WITHIN 3 WORKING DAYS OF TREATMENT   Place  [facility]  Name of Facility  [place]   Date  4/9/2018 Diagnosis  No diagnosis found. Is there evidence the injured employee was under the influence of alcohol and/or another controlled substance at the time of accident?   Hour  9:24 AM Description of Injury or Disease  There were no encounter diagnoses. No   Treatment  Over-the-counter ibuprofen, ice, back support, given information about back strengthening and stretches as well as proper lifting technique  Have you advised the patient to remain off work five days or more? No   X-Ray Findings      If Yes   From Date  To Date      From information given by the employee, together with medical evidence, can you directly connect this injury or occupational disease as job incurred?  Yes If No Full Duty  No Modified Duty  Yes   Is additional medical care by a physician indicated?  Yes If Modified Duty, Specify any Limitations / Restrictions      Do you know of any previous injury or disease contributing to this condition or occupational disease?                            Yes   Date  4/9/2018 Print Doctor’s Name Fredis Lemons M.D. I certify the employer’s copy  "of  this form was mailed on:   Address  [address] Insurer’s Use Only     [facility] Zip  [address]    Provider’s Tax ID Number  [id number] Telephone  Dept: [phone]        kelly-FREDIS Prather M.D.   e-Signature: Dr. Fredis Lemons, Medical Director Degree  MD        ORIGINAL-TREATING PHYSICIAN OR CHIROPRACTOR    PAGE 2-INSURER/TPA    PAGE 3-EMPLOYER    PAGE 4-EMPLOYEE             Form C-4 (rev10/07)              BRIEF DESCRIPTION OF RIGHTS AND BENEFITS  (Pursuant to [place] 616[place])    Notice of Injury or Occupational Disease (Incident Report Form C-1): If an injury or occupational disease (OD) arises out of and in the  course of employment, you must provide written notice to your employer as soon as practicable, but no later than 7 days after the accident or  OD. Your employer shall maintain a sufficient supply of the required forms.    Claim for Compensation (Form C-4): If medical treatment is sought, the form C-4 is available at the place of initial treatment. A completed  \"Claim for Compensation\" (Form C-4) must be filed within 90 days after an accident or OD. The treating physician or chiropractor must,  within 3 working days after treatment, complete and mail to the employer, the employer's insurer and third-party , the Claim for  Compensation.    Medical Treatment: If you require medical treatment for your on-the-job injury or OD, you may be required to select a physician or  chiropractor from a list provided by your workers’ compensation insurer, if it has contracted with an Organization for Managed Care (MCO) or  Preferred Provider Organization (PPO) or providers of health care. If your employer has not entered into a contract with an MCO or PPO, you  may select a physician or chiropractor from the Panel of Physicians and Chiropractors. Any medical costs related to your industrial injury or  OD will be paid by your insurer.    Temporary Total Disability (TTD): If your " doctor has certified that you are unable to work for a period of at least 5 consecutive days, or 5  cumulative days in a 20-day period, or places restrictions on you that your employer does not accommodate, you may be entitled to TTD  compensation.    Temporary Partial Disability (TPD): If the wage you receive upon reemployment is less than the compensation for TTD to which you are  entitled, the insurer may be required to pay you TPD compensation to make up the difference. TPD can only be paid for a maximum of 24  months.    Permanent Partial Disability (PPD): When your medical condition is stable and there is an indication of a PPD as a result of your injury or  OD, within 30 days, your insurer must arrange for an evaluation by a rating physician or chiropractor to determine the degree of your PPD. The  amount of your PPD award depends on the date of injury, the results of the PPD evaluation and your age and wage.    Permanent Total Disability (PTD): If you are medically certified by a treating physician or chiropractor as permanently and totally disabled  and have been granted a PTD status by your insurer, you are entitled to receive monthly benefits not to exceed 66 2/3% of your average  monthly wage. The amount of your PTD payments is subject to reduction if you previously received a PPD award.    Vocational Rehabilitation Services: You may be eligible for vocational rehabilitation services if you are unable to return to the job due to a  permanent physical impairment or permanent restrictions as a result of your injury or occupational disease.    Transportation and Per Sanju Reimbursement: You may be eligible for travel expenses and per sanju associated with medical treatment.    Reopening: You may be able to reopen your claim if your condition worsens after claim closure.    Appeal Process: If you disagree with a written determination issued by the insurer or the insurer does not respond to your request, you  may  appeal to the Department of Administration, , by following the instructions contained in your determination letter. You must  appeal the determination within 70 days from the date of the determination letter at [address], [address], [place], [place]  [address], or [address], [place], [place] [address]. If you disagree with the  decision, you may appeal to the  Department of Administration, . You must file your appeal within 30 days from the date of the  decision  letter at [address], [address], [place], [place] [address], or [address], [address], [place], [place] [address]. If you  disagree with a decision of an , you may file a petition for judicial review with the District Court. You must do so within 30  days of the Appeal Officer’s decision. You may be represented by an  at your own expense or you may contact the [facility] for possible  representation.    [place]  for Injured Workers (NAIW): If you disagree with a  decision, you may request that NAIW represent you  without charge at an  Hearing. For information regarding denial of benefits, you may contact the [facility] at: [address], [address], [place] [address], [phone], or [address], [place] [address], [phone]    To File a Complaint with the Division: If you wish to file a complaint with the  of the Division of Industrial Relations (DIR),  please contact the Workers’ Compensation Section, [address], [address], [place], [place] [address], telephone [phone]) [phone], or  [address] [facility] [address], [place] [address], telephone [phone].    For assistance with Workers’ Compensation Issues: you may contact the Office of the Governor Consumer Health Assistance, [address] [facility]  [address], [place], [place] [address], Toll Free 1-196.901.1100, Web site: http://govcha.[facility].nv., E-mail  Monet@[facility].[place].                                                                                                                                                                                                                                   __________________________________________________________________                                                                   _________________                Employee Name / Signature                                                                                                                                                       Date                                                                                                                                                                                                     D-2 (rev. 10/07)

## 2018-04-09 NOTE — LETTER
[facility] [place]  [address]  [place] [address]  Phone:  [phone] - Fax:  [phone]   [facility] Progress Report and Disability Certification  Date of Service: 4/9/2018   No Show:  No  Date / Time of Next Visit: 4/13/2018   Claim Information   Patient Name: Tomas uGerrier  Claim Number:     Employer: [employer]  Date of Injury: 4/4/2018     Insurer / TPA:   Abel Joseph  ID / SSN:     Occupation:   Diagnosis: There were no encounter diagnoses.    Medical Information   Related to Industrial Injury? Yes    Subjective Complaints:  Patient is a 28-year-old male who presents today with left lower back pain. Patient has had a recent injury not too long ago. 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 States that he was lifting last week and noticed worsening pain throughout the day. Patient denies any numbness or weakness in his leg. No incontinence. Patient took some medicine and his mother said with minimal relief.   Objective Findings: Patient with tenderness over his left sacroiliac joint. No significant muscle spasm at this point. Deep tendon reflexes are equal bilaterally. No obvious weakness or sensory deficit.   Pre-Existing Condition(s):     Assessment:   Initial Visit    Status: Additional Care Required  Permanent Disability:No    Plan: Medication    Diagnostics:      Comments:  Ice, over-the-counter anti-inflammatories, back strengthening as well as stretches    Disability Information   Status: Released to Restricted Duty    From:  4/9/2018  Through: 4/13/2018 Restrictions are: Temporary   Physical Restrictions   Sitting:    Standing:    Stooping:    Bending:  < or = to 4 hrs/day   Squatting:    Walking:    Climbing:  < or = to 4 hrs/day Pushing:      Pulling:  < or = to 4 hrs/day Other:    Reaching Above Shoulder (L):   Reaching Above Shoulder (R):       Reaching Below Shoulder (L):    Reaching Below Shoulder  (R):      Not to exceed Weight Limits   Carrying(hrs):   Weight Limit(lb): < or = to 50 pounds Lifting(hrs):   Weight  Limit(lb): < or = to 25 pounds   Comments:      Repetitive Actions   Hands: i.e. Fine Manipulations from Grasping:     Feet: i.e. Operating Foot Controls:     Driving / Operate Machinery:     Physician Name: Fredis Lemons M.D. Physician Signature: FREDIS Gallagher M.D. e-Signature: Dr. Fredis Lemons, Medical Director   Clinic Name / Location: [address] [address] Clinic Phone Number: Dept: [phone]   Appointment Time: 8:15 Am Visit Start Time: 9:24 AM   Check-In Time:  7:55 Am Visit Discharge Time:  10:05 AM   Original-Treating Physician or Chiropractor    Page 2-Insurer/TPA    Page 3-Employer    Page 4-Employee

## 2018-04-13 ENCOUNTER — APPOINTMENT (OUTPATIENT)
Dept: URGENT CARE | Facility: PHYSICIAN GROUP | Age: 28
End: 2018-04-13
Payer: OTHER MISCELLANEOUS

## 2018-04-16 ENCOUNTER — OCCUPATIONAL MEDICINE (OUTPATIENT)
Dept: URGENT CARE | Facility: PHYSICIAN GROUP | Age: 28
End: 2018-04-16
Payer: OTHER MISCELLANEOUS

## 2018-04-16 VITALS
BODY MASS INDEX: 26.66 KG/M2 | DIASTOLIC BLOOD PRESSURE: 70 MMHG | TEMPERATURE: 98 F | WEIGHT: 160 LBS | OXYGEN SATURATION: 95 % | HEART RATE: 74 BPM | HEIGHT: 65 IN | RESPIRATION RATE: 16 BRPM | SYSTOLIC BLOOD PRESSURE: 110 MMHG

## 2018-04-16 DIAGNOSIS — M53.3 SACRO-ILIAC PAIN: ICD-10-CM

## 2018-04-16 PROCEDURE — 99213 OFFICE O/P EST LOW 20 MIN: CPT | Performed by: FAMILY MEDICINE

## 2018-04-16 RX ORDER — IBUPROFEN 200 MG
200 TABLET ORAL EVERY 6 HOURS PRN
COMMUNITY

## 2018-04-16 ASSESSMENT — PAIN SCALES - GENERAL: PAINLEVEL: 3=SLIGHT PAIN

## 2018-04-23 ENCOUNTER — OCCUPATIONAL MEDICINE (OUTPATIENT)
Dept: OCCUPATIONAL MEDICINE | Facility: CLINIC | Age: 28
End: 2018-04-23
Payer: OTHER MISCELLANEOUS

## 2018-04-23 VITALS
SYSTOLIC BLOOD PRESSURE: 112 MMHG | DIASTOLIC BLOOD PRESSURE: 70 MMHG | OXYGEN SATURATION: 98 % | HEIGHT: 65 IN | WEIGHT: 160 LBS | BODY MASS INDEX: 26.66 KG/M2 | HEART RATE: 74 BPM | RESPIRATION RATE: 16 BRPM

## 2018-04-23 DIAGNOSIS — S39.012D STRAIN OF LUMBAR REGION, SUBSEQUENT ENCOUNTER: ICD-10-CM

## 2018-04-23 PROCEDURE — 99212 OFFICE O/P EST SF 10 MIN: CPT | Performed by: PREVENTIVE MEDICINE

## 2018-04-23 ASSESSMENT — PAIN SCALES - GENERAL: PAINLEVEL: 1=MINIMAL PAIN

## 2018-04-23 NOTE — LETTER
[address],   [address] [address]  Phone:  [phone] - Fax:  [phone]   [facility] Progress Report and Disability Certification  Date of Service: 4/23/2018   No Show:  No  Date / Time of Next Visit:  Release from care   Claim Information   Patient Name: Tomas Guerrier  Claim Number:     Employer: [employer]  Date of Injury: 4/4/2018     Insurer / TPA: [employer]  ID / SSN:     Occupation:   Diagnosis: The encounter diagnosis was Strain of lumbar region, subsequent encounter.    Medical Information   Related to Industrial Injury? Yes    Subjective Complaints:  DOI 4/4/2018: 28-year-old male who presents with left lower back pain. 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 Seen in [facility], advised NSAIDs and work restrictions. Patient states that overall her back pain is much improved. States has very minimal pain with prolonged sitting. Denies any radiating pain, numbness or tingling. Ready for release and full duty.   Objective Findings: Lumbar: No gross deformity. No tenderness palpation. Full range of motion. Normal gait.   Pre-Existing Condition(s):     Assessment:   Condition Improved    Status: Discharged /  MMI  Permanent Disability:No    Plan:      Diagnostics:      Comments:  Release from care  Full duty  Follow-up as needed    Disability Information   Status: Released to Full Duty    From:  4/23/2018  Through:   Restrictions are:     Physical Restrictions   Sitting:    Standing:    Stooping:    Bending:      Squatting:    Walking:    Climbing:    Pushing:      Pulling:    Other:    Reaching Above Shoulder (L):   Reaching Above Shoulder (R):       Reaching Below Shoulder (L):    Reaching Below Shoulder (R):      Not to exceed Weight Limits   Carrying(hrs):   Weight Limit(lb):   Lifting(hrs):   Weight   Limit(lb):     Comments:      Repetitive Actions   Hands: i.e. Fine Manipulations from Grasping:     Feet: i.e. Operating Foot Controls:     Driving / Operate Machinery:     Physician Name: Stefano Lee D.O. Physician Signature: TinoTASTEFANO JOYNER D.O. e-Signature: Dr. Fredis Lemons, Medical Director   Clinic Name / Location: [address],   [address]  [place] [address] Clinic Phone Number: Dept: [phone]   Appointment Time: 4:25 Pm Visit Start Time: 4:07 PM   Check-In Time:  4:00 Pm Visit Discharge Time:  4:29pm   Original-Treating Physician or Chiropractor    Page 2-Insurer/TPA    Page 3-Employer    Page 4-Employee

## 2018-04-23 NOTE — PROGRESS NOTES
"Subjective:      Tomas Guerrier is a 28 y.o. male who presents with Follow-Up ([facility] Back injury, DOI 4/9/18 Better Room # 3)      DOI 4/4/2018: 28-year-old male who presents with left lower back pain. 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 Seen in [facility], advised NSAIDs and work restrictions. Patient states that overall her back pain is much improved. States has very minimal pain with prolonged sitting. Denies any radiating pain, numbness or tingling. Ready for release and full duty.     HPI    ROS       Objective:     /70   Pulse 74   Resp 16   Ht 1.651 m (5' 5\")   Wt 72.6 kg (160 lb)   SpO2 98%   BMI 26.63 kg/m²      Physical Exam    Lumbar: No gross deformity. No tenderness palpation. Full range of motion. Normal gait.       Assessment/Plan:     1. Strain of lumbar region, subsequent encounter  Release from care  Full duty  Follow-up as needed      "

## 2018-04-23 NOTE — PROGRESS NOTES
"Subjective:   Tomas Guerrier is a 28 y.o. male who presents for Work-Related Injury ([facility], Back injury, DOI 4/9/18)    Patient is a 28-year-old male who presents today with left lower back pain. Patient has had a recent injury not too long ago. 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 States that he was lifting last week and noticed worsening pain throughout the day. Patient denies any numbness or weakness in his leg. No incontinence. Patient took some medicine and his mother said with minimal relief.  Follow-up 4/16/2018: Patient endorses no change in symptoms despite work restrictions. Has been continuing his stretching and using NSAIDs for relief. Denies current numbness/tingling/weakness in bilateral lower extremities.HPI  ROS   Objective:   /70   Pulse 74   Temp 36.7 °C (98 °F)   Resp 16   Ht 1.651 m (5' 5\")   Wt 72.6 kg (160 lb)   SpO2 95%   BMI 26.63 kg/m²   Physical Exam   Constitutional: He is oriented to person, place, and time. He appears well-developed and well-nourished. No distress.   HENT:   Head: Normocephalic and atraumatic.   Eyes: Conjunctivae are normal. Pupils are equal, round, and reactive to light.   Cardiovascular: Normal rate and regular rhythm.    Pulmonary/Chest: Effort normal and breath sounds normal.   Neurological: He is alert and oriented to person, place, and time.   Skin: Skin is warm and dry.   Psychiatric: He has a normal mood and affect. Thought content normal.   Vitals reviewed.    TTP left SI joint. DTR intact bilaterally. Gait intact. FROM LLE.  Assessment/Plan:     1. Sacro-iliac pain  Differential diagnosis, natural history, supportive care, and indications for immediate follow-up discussed.  Use over-the-counter pain reliever, such as acetaminophen (Tylenol), ibuprofen (Advil, Motrin) or naproxen (Aleve) as needed; follow package directions for dosing.   "

## 2021-07-28 NOTE — LETTER
[facility]  [address]  [place] [address]  Phone:  [phone] - Fax:  [phone]   [facility] Progress Report and Disability Certification  Date of Service: 4/16/2018   No Show:  No  Date / Time of Next Visit: 4/23/2018   Claim Information   Patient Name: Tomas Guerrier  Claim Number:     Employer: [employer]  Date of Injury: 4/4/2018     Insurer / TPA: [employer] Workers [employer]  ID / SSN:     Occupation:   Diagnosis: The encounter diagnosis was Sacro-iliac pain.    Medical Information   Related to Industrial Injury? Yes    Subjective Complaints:  Patient is a 28-year-old male who presents today with left lower back pain. Patient has had a recent injury not too long ago. 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 States that he was lifting last week and noticed worsening pain throughout the day. Patient denies any numbness or weakness in his leg. No incontinence. Patient took some medicine and his mother said with minimal relief.  Follow-up 4/16/2018: Patient endorses no change in symptoms despite work restrictions. Has been continuing his stretching and using NSAIDs for relief. Denies current numbness/tingling/weakness in bilateral lower extremities.   Objective Findings: TTP left SI joint. DTR intact bilaterally. Gait intact. FROM LLE.   Pre-Existing Condition(s):     Assessment:   Condition Same    Status: Additional Care Required  Permanent Disability:No    Plan:      Diagnostics:      Comments:       Disability Information   Status: Released to Restricted Duty    From:  4/16/2018  Through: 4/23/2018 Restrictions are: Temporary   Physical Restrictions   Sitting:    Standing:    Stooping:  < or = to 1 hr/day Bending:      Squatting:    Walking:    Climbing:  < or = to 2 hrs/day Pushing:      Pulling:  < or = to 4 hrs/day Other:    Reaching Above Shoulder (L):   Reaching Above Shoulder (R):       Reaching Below Shoulder  CMN order signed, faxed back and scanned into chart.    MINOR Reyna       (L):    Reaching Below Shoulder (R):      Not to exceed Weight Limits   Carrying(hrs):   Weight Limit(lb): < or = to 10 pounds Lifting(hrs):   Weight  Limit(lb): < or = to 10 pounds   Comments:      Repetitive Actions   Hands: i.e. Fine Manipulations from Grasping:     Feet: i.e. Operating Foot Controls:     Driving / Operate Machinery:     Physician Name: Filemon Lantigua M.D. Physician Signature: FILEMON York M.D. e-Signature: Dr. Fredis Lemons, Medical Director   Clinic Name / Location: [address] [address] Clinic Phone Number: Dept: [phone]   Appointment Time: 3:20 Pm Visit Start Time: 3:34 PM   Check-In Time:  3:16 Pm Visit Discharge Time:  4:00 PM    Original-Treating Physician or Chiropractor    Page 2-Insurer/TPA    Page 3-Employer    Page 4-Employee